# Patient Record
Sex: MALE | Race: BLACK OR AFRICAN AMERICAN | Employment: STUDENT | URBAN - METROPOLITAN AREA
[De-identification: names, ages, dates, MRNs, and addresses within clinical notes are randomized per-mention and may not be internally consistent; named-entity substitution may affect disease eponyms.]

---
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
## 2025-01-16 ENCOUNTER — HOSPITAL ENCOUNTER (EMERGENCY)
Age: 25
Discharge: HOME | End: 2025-01-16
Attending: EMERGENCY MEDICINE

## 2025-01-16 VITALS
SYSTOLIC BLOOD PRESSURE: 157 MMHG | TEMPERATURE: 98.8 F | HEART RATE: 70 BPM | RESPIRATION RATE: 20 BRPM | HEIGHT: 71 IN | WEIGHT: 135 LBS | BODY MASS INDEX: 18.9 KG/M2 | OXYGEN SATURATION: 98 % | DIASTOLIC BLOOD PRESSURE: 85 MMHG

## 2025-01-16 DIAGNOSIS — J10.1 INFLUENZA A: Primary | ICD-10-CM

## 2025-01-16 LAB
FLUAV RNA SPEC QL NAA+PROBE: DETECTED
FLUBV RNA SPEC QL NAA+PROBE: NOT DETECTED
SARS-COV-2 RDRP RESP QL NAA+PROBE: NOT DETECTED
SOURCE: NORMAL

## 2025-01-16 PROCEDURE — 87502 INFLUENZA DNA AMP PROBE: CPT

## 2025-01-16 PROCEDURE — 99282 EMERGENCY DEPT VISIT SF MDM: CPT

## 2025-01-16 PROCEDURE — 87635 SARS-COV-2 COVID-19 AMP PRB: CPT

## 2025-01-16 ASSESSMENT — PAIN SCALES - GENERAL: PAINLEVEL_OUTOF10: 6

## 2025-01-16 ASSESSMENT — LIFESTYLE VARIABLES
HOW OFTEN DO YOU HAVE A DRINK CONTAINING ALCOHOL: NEVER
HOW MANY STANDARD DRINKS CONTAINING ALCOHOL DO YOU HAVE ON A TYPICAL DAY: PATIENT DOES NOT DRINK

## 2025-01-16 ASSESSMENT — PAIN - FUNCTIONAL ASSESSMENT: PAIN_FUNCTIONAL_ASSESSMENT: 0-10

## 2025-01-16 NOTE — ED PROVIDER NOTES
Have now called patient 5 times and staff has as well with no response have been doing this since fairly early inpatient stay     Bj Koo MD  01/16/25 [id number]        Review of patient's triage patient has had approximately 1 day of generalized bodyaches and cough.  No GI symptoms were reported.  At the time of this note patient has been called probably multiple times over slightly over 1 hour.  5:33 PM       Bj Koo MD  01/16/25 0742

## 2025-01-16 NOTE — ED TRIAGE NOTES
Pt presents to ED with c/o cough that started yesterday as well as generalized body aches and malaise.